# Patient Record
Sex: FEMALE | Race: WHITE
[De-identification: names, ages, dates, MRNs, and addresses within clinical notes are randomized per-mention and may not be internally consistent; named-entity substitution may affect disease eponyms.]

---

## 2018-07-16 ENCOUNTER — HOSPITAL ENCOUNTER (OUTPATIENT)
Dept: HOSPITAL 95 - MOI MAM | Age: 68
Discharge: HOME | End: 2018-07-16
Attending: GENERAL PRACTICE
Payer: COMMERCIAL

## 2018-07-16 DIAGNOSIS — N60.31: Primary | ICD-10-CM

## 2018-07-16 PROCEDURE — G0279 TOMOSYNTHESIS, MAMMO: HCPCS

## 2018-07-16 PROCEDURE — A4648 IMPLANTABLE TISSUE MARKER: HCPCS

## 2020-09-11 NOTE — NUR
09/11/20 1031 Marti Galvan
PT DENIES NAUSEA AND IS TOLERATING PO FLUIDS AND SNACKS WELL. VSS. PT
COMPLAINS OF PAIN 9/10. RN TREATING WITH IV PAIN MEDICATION AND PO
PAIN MEDICATION PER DR'S ORDERS. WARM BLANKETS PROVIDED. CALL LIGHT IN
REACH. POLAR UNIT ON AND RUNNING. OP LIMB IS ELEVATED WITH PILLOWS.
 AT CHAIRSIDE.